# Patient Record
Sex: MALE | URBAN - METROPOLITAN AREA
[De-identification: names, ages, dates, MRNs, and addresses within clinical notes are randomized per-mention and may not be internally consistent; named-entity substitution may affect disease eponyms.]

---

## 2017-11-07 ENCOUNTER — APPOINTMENT (OUTPATIENT)
Age: 22
Setting detail: DERMATOLOGY
End: 2017-11-13

## 2017-11-07 DIAGNOSIS — L70.0 ACNE VULGARIS: ICD-10-CM

## 2017-11-07 PROCEDURE — OTHER FACIAL: OTHER

## 2017-11-07 ASSESSMENT — LOCATION DETAILED DESCRIPTION DERM: LOCATION DETAILED: LEFT INFERIOR CENTRAL MALAR CHEEK

## 2017-11-07 ASSESSMENT — LOCATION ZONE DERM: LOCATION ZONE: FACE

## 2017-11-07 ASSESSMENT — LOCATION SIMPLE DESCRIPTION DERM: LOCATION SIMPLE: LEFT CHEEK

## 2017-11-07 NOTE — PROCEDURE: FACIAL
Extraction Method: extractor
Price (Use Numbers Only, No Special Characters Or $): $105.00
Facial Steaming: steamed
Exfoliation Type: gentle
Treatment Type (Optional): Spa Facial
Detail Level: Zone

## 2018-08-24 ENCOUNTER — APPOINTMENT (OUTPATIENT)
Age: 23
Setting detail: DERMATOLOGY
End: 2018-08-24

## 2018-08-24 DIAGNOSIS — L70.0 ACNE VULGARIS: ICD-10-CM

## 2018-08-24 PROCEDURE — OTHER PCA CHEMICAL PEEL: OTHER

## 2018-08-24 ASSESSMENT — LOCATION SIMPLE DESCRIPTION DERM
LOCATION SIMPLE: RIGHT CHEEK
LOCATION SIMPLE: GLABELLA
LOCATION SIMPLE: LEFT CHEEK

## 2018-08-24 ASSESSMENT — LOCATION ZONE DERM: LOCATION ZONE: FACE

## 2018-08-24 ASSESSMENT — LOCATION DETAILED DESCRIPTION DERM
LOCATION DETAILED: LEFT CENTRAL MALAR CHEEK
LOCATION DETAILED: GLABELLA
LOCATION DETAILED: RIGHT LATERAL MALAR CHEEK

## 2018-10-02 ENCOUNTER — APPOINTMENT (OUTPATIENT)
Age: 23
Setting detail: DERMATOLOGY
End: 2018-10-05

## 2018-10-02 DIAGNOSIS — L70.0 ACNE VULGARIS: ICD-10-CM

## 2018-10-02 PROCEDURE — OTHER PCA CHEMICAL PEEL: OTHER

## 2018-10-02 ASSESSMENT — LOCATION DETAILED DESCRIPTION DERM: LOCATION DETAILED: LEFT INFERIOR CENTRAL MALAR CHEEK

## 2018-10-02 ASSESSMENT — LOCATION SIMPLE DESCRIPTION DERM: LOCATION SIMPLE: LEFT CHEEK

## 2018-10-02 ASSESSMENT — LOCATION ZONE DERM: LOCATION ZONE: FACE

## 2018-10-02 NOTE — PROCEDURE: PCA CHEMICAL PEEL
Prep: The treated area was degreased with pre-peel cleanser.
Price (Use Numbers Only, No Special Characters Or $): 105
Post Peel Care: The peel auto-neutralized.  After the procedure a post-peel cream was applied. Sun protection and post-care instructions were reviewed with the patient.
Detail Level: Zone
Post-Care Instructions: I reviewed with the patient in detail post-care instructions. Patient should avoid sun exposure and wear sun protection.
Consent: Prior to the procedure, written consent was obtained and risks were reviewed, including but not limited to: redness, peeling, blistering, pigmentary change, scarring, infection, and pain.
Time (Mins): 4
Treatment Number: 2

## 2018-11-13 ENCOUNTER — APPOINTMENT (OUTPATIENT)
Age: 23
Setting detail: DERMATOLOGY
End: 2018-11-13

## 2018-11-13 DIAGNOSIS — L70.0 ACNE VULGARIS: ICD-10-CM

## 2018-11-13 PROCEDURE — OTHER PCA CHEMICAL PEEL: OTHER

## 2018-11-13 ASSESSMENT — LOCATION ZONE DERM
LOCATION ZONE: FACE
LOCATION ZONE: FACE

## 2018-11-13 ASSESSMENT — LOCATION DETAILED DESCRIPTION DERM
LOCATION DETAILED: LEFT SUPERIOR MEDIAL BUCCAL CHEEK
LOCATION DETAILED: LEFT INFERIOR CENTRAL MALAR CHEEK

## 2018-11-13 ASSESSMENT — LOCATION SIMPLE DESCRIPTION DERM
LOCATION SIMPLE: LEFT CHEEK
LOCATION SIMPLE: LEFT CHEEK

## 2018-11-13 NOTE — PROCEDURE: PCA CHEMICAL PEEL
Treatment Number: 3
Prep: The treated area was degreased with pre-peel cleanser.
Price (Use Numbers Only, No Special Characters Or $): 105
Post-Care Instructions: I reviewed with the patient in detail post-care instructions. Patient should avoid sun exposure and wear sun protection.
Detail Level: Zone
Consent: Prior to the procedure, written consent was obtained and risks were reviewed, including but not limited to: redness, peeling, blistering, pigmentary change, scarring, infection, and pain.
Post Peel Care: The peel auto-neutralized.  After the procedure a post-peel cream was applied. Sun protection and post-care instructions were reviewed with the patient.